# Patient Record
Sex: FEMALE | Race: WHITE | NOT HISPANIC OR LATINO | Employment: STUDENT | URBAN - METROPOLITAN AREA
[De-identification: names, ages, dates, MRNs, and addresses within clinical notes are randomized per-mention and may not be internally consistent; named-entity substitution may affect disease eponyms.]

---
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
## 2024-07-08 ENCOUNTER — TELEPHONE (OUTPATIENT)
Dept: OBSTETRICS AND GYNECOLOGY | Facility: CLINIC | Age: 19
End: 2024-07-08
Payer: MEDICAID

## 2024-07-08 NOTE — TELEPHONE ENCOUNTER
----- Message from Aleyda Hernandez sent at 7/8/2024 11:30 AM CDT -----  Contact: Pt Mom  Type:  Needs Medical Advice  Who Called:  Pt  Symptoms (please be specific): Pg confirmation   Best Call Back Number:  [phone]  Additional Information:  Pt Mom is trying to find out of Dr. Santiago will accept pt ins for Pg confirm.  Pt Mom would like to speak w/someone in the ofc of the doctor... Please call to advise... Thank you...

## 2024-07-08 NOTE — TELEPHONE ENCOUNTER
Returned pt call and answered pt concerns about if  accepting medicaid OB patients. Pt understood and already been scheduled.

## 2024-07-29 ENCOUNTER — OFFICE VISIT (OUTPATIENT)
Dept: OBSTETRICS AND GYNECOLOGY | Facility: CLINIC | Age: 19
End: 2024-07-29
Payer: MEDICAID

## 2024-07-29 VITALS — WEIGHT: 189.38 LBS | DIASTOLIC BLOOD PRESSURE: 74 MMHG | SYSTOLIC BLOOD PRESSURE: 124 MMHG

## 2024-07-29 DIAGNOSIS — O21.9 NAUSEA/VOMITING IN PREGNANCY: ICD-10-CM

## 2024-07-29 DIAGNOSIS — Z32.01 ENCOUNTER FOR PREGNANCY TEST, RESULT POSITIVE: Primary | ICD-10-CM

## 2024-07-29 DIAGNOSIS — Z3A.12 12 WEEKS GESTATION OF PREGNANCY: ICD-10-CM

## 2024-07-29 DIAGNOSIS — Z12.4 CERVICAL CANCER SCREENING: ICD-10-CM

## 2024-07-29 LAB
B-HCG UR QL: POSITIVE
CTP QC/QA: YES

## 2024-07-29 PROCEDURE — 99999 PR PBB SHADOW E&M-EST. PATIENT-LVL II: CPT | Mod: PBBFAC,,, | Performed by: OBSTETRICS & GYNECOLOGY

## 2024-07-29 PROCEDURE — 87088 URINE BACTERIA CULTURE: CPT | Performed by: OBSTETRICS & GYNECOLOGY

## 2024-07-29 PROCEDURE — 87086 URINE CULTURE/COLONY COUNT: CPT | Performed by: OBSTETRICS & GYNECOLOGY

## 2024-07-29 PROCEDURE — 99212 OFFICE O/P EST SF 10 MIN: CPT | Mod: PBBFAC,TH,PN,25 | Performed by: OBSTETRICS & GYNECOLOGY

## 2024-07-29 PROCEDURE — 76817 TRANSVAGINAL US OBSTETRIC: CPT | Mod: PBBFAC,PN | Performed by: OBSTETRICS & GYNECOLOGY

## 2024-07-29 PROCEDURE — 87591 N.GONORRHOEAE DNA AMP PROB: CPT | Performed by: OBSTETRICS & GYNECOLOGY

## 2024-07-29 PROCEDURE — 87186 SC STD MICRODIL/AGAR DIL: CPT | Performed by: OBSTETRICS & GYNECOLOGY

## 2024-07-29 PROCEDURE — 87491 CHLMYD TRACH DNA AMP PROBE: CPT | Performed by: OBSTETRICS & GYNECOLOGY

## 2024-07-29 PROCEDURE — 88175 CYTOPATH C/V AUTO FLUID REDO: CPT | Performed by: OBSTETRICS & GYNECOLOGY

## 2024-07-29 PROCEDURE — 81025 URINE PREGNANCY TEST: CPT | Mod: PBBFAC,PN | Performed by: OBSTETRICS & GYNECOLOGY

## 2024-07-29 PROCEDURE — 99999PBSHW POCT URINE PREGNANCY: Mod: PBBFAC,,,

## 2024-07-29 PROCEDURE — 87661 TRICHOMONAS VAGINALIS AMPLIF: CPT | Performed by: OBSTETRICS & GYNECOLOGY

## 2024-07-29 RX ORDER — ASPIRIN 81 MG/1
81 TABLET ORAL DAILY
Qty: 150 TABLET | Refills: 2 | Status: SHIPPED | OUTPATIENT
Start: 2024-07-29 | End: 2025-07-29

## 2024-07-29 RX ORDER — PYRIDOXINE HCL (VITAMIN B6) 25 MG
25 TABLET ORAL 3 TIMES DAILY
Qty: 90 TABLET | Refills: 1 | Status: SHIPPED | OUTPATIENT
Start: 2024-07-29 | End: 2024-08-28

## 2024-07-29 RX ORDER — PROMETHAZINE HYDROCHLORIDE 25 MG/1
25 TABLET ORAL EVERY 6 HOURS PRN
Qty: 30 TABLET | Refills: 1 | Status: SHIPPED | OUTPATIENT
Start: 2024-07-29

## 2024-07-29 NOTE — PROGRESS NOTES
Subjective:      Chela Gold is being seen today for her first obstetrical visit.  She is at Unknown gestation.     Patient's last menstrual period was 05/04/2024 (exact date).   No prior ultrasound  Complains of nausea    Menstrual History:  OB History    No obstetric history on file.          The following portions of the patient's history were reviewed and updated as appropriate: allergies, current medications, past family history, past medical history, past social history, past surgical history, and problem list.     Review of Systems  Constitutional: negative for chills and fatigue  Gastrointestinal: negative for abdominal pain, constipation, diarrhea, nausea and vomiting  Genitourinary:negative for abnormal menstrual periods, genital lesions and vaginal discharge, dysuria, frequency and hematuria     Objective:      /74   Wt 85.9 kg (189 lb 6 oz)   LMP 05/04/2024 (Exact Date)   General appearance: alert, appears stated age, and cooperative      ULTRASOUND:   Bedside Ultrasound Findings    EXAMINATION:  US PELVIS COMP WITH TRANSVAG OB    CLINICAL HISTORY:  UPT positive    TECHNIQUE:  Transvaginal sonography    COMPARISON:  None    FINDINGS:  1. Uterus:    Appearance: Viable powell intrauterine pregnancy was seen, yolk sac was not seen. Crown-rump length = 61.9 mm with flicker, consistent with 12.4wga and EDC 2/6/2025.        Impression      1. Single viable intrauterine pregnancy   2. Crown rump length consistent with 12.4wga, and estimated due date 2/6/2025    Final EDC: 2/6/2024 by 12wk US    Assessment:      Pregnancy at Unknown       Plan:      Initial labs drawn.  Prenatal vitamins.  Problem list reviewed and updated.    Encounter for pregnancy test, result positive  -     POCT urine pregnancy  -     Trichomonas vaginalis, RNA, Qual, Urine  -     C. trachomatis/N. gonorrhoeae by [facility]; Urine  -     CULTURE, URINE  -     Varicella Zoster Antibody, IgG; Future; Expected date:  07/29/2024  -     Type & Screen - Ob Profile; Future; Expected date: 07/29/2024  -     Rubella Antibody, IgG; Future; Expected date: 07/29/2024  -     HIV 1/2 Ag/Ab (4th Gen); Future; Expected date: 07/29/2024  -     Hepatitis C Antibody; Future; Expected date: 07/29/2024  -     Hepatitis B Surface Antigen; Future; Expected date: 07/29/2024  -     CBC Auto Differential; Future; Expected date: 07/29/2024  -     Treponema Pallidium Antibodies IgG, IgM; Future; Expected date: 07/29/2024  -     aspirin (ECOTRIN) 81 MG EC tablet; Take 1 tablet (81 mg total) by mouth once daily. At 12 weeks start taking aspirin daily to prevent preeclampsia.  Dispense: 150 tablet; Refill: 2    Nausea/vomiting in pregnancy  -     promethazine (PHENERGAN) 25 MG tablet; Take 1 tablet (25 mg total) by mouth every 6 (six) hours as needed for Nausea.  Dispense: 30 tablet; Refill: 1  -     doxylamine succinate (UNISOM, DOXYLAMINE,) 25 mg tablet; Take 1/2 tablet three times a day to prevent nausea/vomitting in pregnancy  -     pyridoxine, vitamin B6, (B-6) 25 MG Tab; Take 1 tablet (25 mg total) by mouth 3 (three) times daily.  Dispense: 90 tablet; Refill: 1    Cervical cancer screening  -     Cancel: Liquid-Based Pap Smear, Screening    12 weeks gestation of pregnancy    Other orders  -     Pap Smear, Thin Prep with Reflex to HPV        Follow up in 4 weeks.    I reviewed today her blood pressure, weight gain, urine results and  fetal heart rate.  I have reviewed the previous labs and ultrasound with the patient.   I have answered questions to her satisfaction and total of 20 minutes spend today

## 2024-07-31 DIAGNOSIS — O23.40 URINARY TRACT INFECTION IN MOTHER DURING PREGNANCY, ANTEPARTUM: Primary | ICD-10-CM

## 2024-07-31 LAB
C TRACH DNA SPEC QL NAA+PROBE: NOT DETECTED
N GONORRHOEA DNA SPEC QL NAA+PROBE: NOT DETECTED
SPECIMEN SOURCE: NORMAL
T VAGINALIS RRNA SPEC QL NAA+PROBE: NEGATIVE

## 2024-07-31 RX ORDER — AMOXICILLIN AND CLAVULANATE POTASSIUM 875; 125 MG/1; MG/1
1 TABLET, FILM COATED ORAL EVERY 12 HOURS
Qty: 10 TABLET | Refills: 0 | Status: SHIPPED | OUTPATIENT
Start: 2024-07-31 | End: 2024-08-05

## 2024-07-31 NOTE — PROCEDURES
Procedures  ULTRASOUND:   Bedside Ultrasound Findings    EXAMINATION:  US PELVIS COMP WITH TRANSVAG OB    CLINICAL HISTORY:  UPT positive    TECHNIQUE:  Transvaginal sonography    COMPARISON:  None    FINDINGS:  1. Uterus:    Appearance: Viable powell intrauterine pregnancy was seen, yolk sac was not seen. Crown-rump length = 61.9 mm with flicker, consistent with 12.4wga and EDC 2/6/2025.        Impression      1. Single viable intrauterine pregnancy   2. Crown rump length consistent with 12.4wga, and estimated due date 2/6/2025    Final EDC: 2/6/2024 by 12wk US

## 2024-08-01 ENCOUNTER — TELEPHONE (OUTPATIENT)
Dept: OBSTETRICS AND GYNECOLOGY | Facility: CLINIC | Age: 19
End: 2024-08-01
Payer: MEDICAID

## 2024-08-01 DIAGNOSIS — O23.40 URINARY TRACT INFECTION IN MOTHER DURING PREGNANCY, ANTEPARTUM: Primary | ICD-10-CM

## 2024-08-01 LAB — BACTERIA UR CULT: ABNORMAL

## 2024-08-01 RX ORDER — NITROFURANTOIN 25; 75 MG/1; MG/1
100 CAPSULE ORAL 2 TIMES DAILY
Qty: 10 CAPSULE | Refills: 0 | Status: SHIPPED | OUTPATIENT
Start: 2024-08-01 | End: 2024-08-06

## 2024-08-01 NOTE — TELEPHONE ENCOUNTER
----- Message from Mary Jane Santiago MD sent at 7/31/2024  5:06 PM CDT -----  Your urinalysis is consistent with a urinary tract infection.  I have called in a prescription for an antibiotic, Augmentin, to assist in eliminating this infection.   If you have any further questions or concerns, please do not hesitate to contact me.      Mary Jane Santiago MD

## 2024-08-02 ENCOUNTER — PATIENT MESSAGE (OUTPATIENT)
Dept: OBSTETRICS AND GYNECOLOGY | Facility: CLINIC | Age: 19
End: 2024-08-02
Payer: MEDICAID

## 2024-08-04 ENCOUNTER — PATIENT MESSAGE (OUTPATIENT)
Dept: OBSTETRICS AND GYNECOLOGY | Facility: CLINIC | Age: 19
End: 2024-08-04
Payer: MEDICAID

## 2024-08-04 DIAGNOSIS — O23.40 URINARY TRACT INFECTION IN MOTHER DURING PREGNANCY, ANTEPARTUM: ICD-10-CM

## 2024-08-05 RX ORDER — AMOXICILLIN AND CLAVULANATE POTASSIUM 875; 125 MG/1; MG/1
1 TABLET, FILM COATED ORAL EVERY 12 HOURS
Qty: 10 TABLET | Refills: 0 | Status: SHIPPED | OUTPATIENT
Start: 2024-08-05 | End: 2024-08-10

## 2024-08-06 ENCOUNTER — LAB VISIT (OUTPATIENT)
Dept: LAB | Facility: HOSPITAL | Age: 19
End: 2024-08-06
Attending: OBSTETRICS & GYNECOLOGY
Payer: MEDICAID

## 2024-08-06 DIAGNOSIS — Z32.01 ENCOUNTER FOR PREGNANCY TEST, RESULT POSITIVE: ICD-10-CM

## 2024-08-06 LAB
ABO + RH BLD: NORMAL
BASOPHILS # BLD AUTO: 0.03 K/UL (ref 0–0.2)
BASOPHILS NFR BLD: 0.3 % (ref 0–1.9)
BLD GP AB SCN CELLS X3 SERPL QL: NORMAL
DIFFERENTIAL METHOD BLD: NORMAL
EOSINOPHIL # BLD AUTO: 0.1 K/UL (ref 0–0.5)
EOSINOPHIL NFR BLD: 0.9 % (ref 0–8)
ERYTHROCYTE [DISTWIDTH] IN BLOOD BY AUTOMATED COUNT: 13.1 % (ref 11.5–14.5)
HBV SURFACE AG SERPL QL IA: NORMAL
HCT VFR BLD AUTO: 37.6 % (ref 37–48.5)
HCV AB SERPL QL IA: NORMAL
HGB BLD-MCNC: 12.4 G/DL (ref 12–16)
HIV 1+2 AB+HIV1 P24 AG SERPL QL IA: NORMAL
IMM GRANULOCYTES # BLD AUTO: 0.01 K/UL (ref 0–0.04)
IMM GRANULOCYTES NFR BLD AUTO: 0.1 % (ref 0–0.5)
LYMPHOCYTES # BLD AUTO: 2 K/UL (ref 1–4.8)
LYMPHOCYTES NFR BLD: 21.4 % (ref 18–48)
MCH RBC QN AUTO: 30.2 PG (ref 27–31)
MCHC RBC AUTO-ENTMCNC: 33 G/DL (ref 32–36)
MCV RBC AUTO: 92 FL (ref 82–98)
MONOCYTES # BLD AUTO: 0.5 K/UL (ref 0.3–1)
MONOCYTES NFR BLD: 5 % (ref 4–15)
NEUTROPHILS # BLD AUTO: 6.7 K/UL (ref 1.8–7.7)
NEUTROPHILS NFR BLD: 72.3 % (ref 38–73)
NRBC BLD-RTO: 0 /100 WBC
PLATELET # BLD AUTO: 217 K/UL (ref 150–450)
PMV BLD AUTO: 9.9 FL (ref 9.2–12.9)
RBC # BLD AUTO: 4.1 M/UL (ref 4–5.4)
TREPONEMA PALLIDUM IGG+IGM AB [PRESENCE] IN SERUM OR PLASMA BY IMMUNOASSAY: NONREACTIVE
WBC # BLD AUTO: 9.25 K/UL (ref 3.9–12.7)

## 2024-08-06 PROCEDURE — 87389 HIV-1 AG W/HIV-1&-2 AB AG IA: CPT | Performed by: OBSTETRICS & GYNECOLOGY

## 2024-08-06 PROCEDURE — 86787 VARICELLA-ZOSTER ANTIBODY: CPT | Performed by: OBSTETRICS & GYNECOLOGY

## 2024-08-06 PROCEDURE — 86900 BLOOD TYPING SEROLOGIC ABO: CPT | Performed by: OBSTETRICS & GYNECOLOGY

## 2024-08-06 PROCEDURE — 86593 SYPHILIS TEST NON-TREP QUANT: CPT | Performed by: OBSTETRICS & GYNECOLOGY

## 2024-08-06 PROCEDURE — 85025 COMPLETE CBC W/AUTO DIFF WBC: CPT | Performed by: OBSTETRICS & GYNECOLOGY

## 2024-08-06 PROCEDURE — 86901 BLOOD TYPING SEROLOGIC RH(D): CPT | Performed by: OBSTETRICS & GYNECOLOGY

## 2024-08-06 PROCEDURE — 86803 HEPATITIS C AB TEST: CPT | Performed by: OBSTETRICS & GYNECOLOGY

## 2024-08-06 PROCEDURE — 86762 RUBELLA ANTIBODY: CPT | Performed by: OBSTETRICS & GYNECOLOGY

## 2024-08-06 PROCEDURE — 36415 COLL VENOUS BLD VENIPUNCTURE: CPT | Mod: PN | Performed by: OBSTETRICS & GYNECOLOGY

## 2024-08-06 PROCEDURE — 86850 RBC ANTIBODY SCREEN: CPT | Performed by: OBSTETRICS & GYNECOLOGY

## 2024-08-06 PROCEDURE — 87340 HEPATITIS B SURFACE AG IA: CPT | Performed by: OBSTETRICS & GYNECOLOGY

## 2024-08-07 LAB
RUBV IGG SER-ACNC: 14.6 IU/ML
RUBV IGG SER-IMP: REACTIVE
VARICELLA INTERPRETATION: POSITIVE
VARICELLA ZOSTER IGG: 498.2

## 2024-08-16 ENCOUNTER — PATIENT MESSAGE (OUTPATIENT)
Dept: OBSTETRICS AND GYNECOLOGY | Facility: CLINIC | Age: 19
End: 2024-08-16
Payer: MEDICAID

## 2024-08-19 ENCOUNTER — TELEPHONE (OUTPATIENT)
Dept: OBSTETRICS AND GYNECOLOGY | Facility: CLINIC | Age: 19
End: 2024-08-19
Payer: MEDICAID

## 2024-08-19 NOTE — TELEPHONE ENCOUNTER
----- Message from Leander Marcos sent at 8/19/2024  4:54 PM CDT -----  Contact: Self  Type: Needs Medical Advice  Who Called:  Patient    Best Call Back Number: [phone]  Additional Information: Patient has an appt tomorrow, but she is requesting a call back to see if she needs to push it back or not

## 2024-08-19 NOTE — TELEPHONE ENCOUNTER
Called pt to get her rescheduled , LVM to call office back or message on what time and date she wants to switch to and ill get it worked out for her.

## 2024-08-20 ENCOUNTER — PATIENT MESSAGE (OUTPATIENT)
Dept: OBSTETRICS AND GYNECOLOGY | Facility: CLINIC | Age: 19
End: 2024-08-20
Payer: MEDICAID

## 2024-09-06 ENCOUNTER — ROUTINE PRENATAL (OUTPATIENT)
Dept: OBSTETRICS AND GYNECOLOGY | Facility: CLINIC | Age: 19
End: 2024-09-06
Payer: MEDICAID

## 2024-09-06 VITALS — DIASTOLIC BLOOD PRESSURE: 80 MMHG | SYSTOLIC BLOOD PRESSURE: 124 MMHG | WEIGHT: 191.56 LBS

## 2024-09-06 DIAGNOSIS — Z3A.18 18 WEEKS GESTATION OF PREGNANCY: Primary | ICD-10-CM

## 2024-09-06 LAB
BILIRUBIN, UA POC OHS: NEGATIVE
BLOOD, UA POC OHS: NEGATIVE
CLARITY, UA POC OHS: CLEAR
COLOR, UA POC OHS: YELLOW
GLUCOSE, UA POC OHS: NEGATIVE
KETONES, UA POC OHS: NEGATIVE
LEUKOCYTES, UA POC OHS: ABNORMAL
NITRITE, UA POC OHS: NEGATIVE
PH, UA POC OHS: 7.5
PROTEIN, UA POC OHS: ABNORMAL
SPECIFIC GRAVITY, UA POC OHS: 1.02
UROBILINOGEN, UA POC OHS: 0.2

## 2024-09-06 PROCEDURE — 99999 PR PBB SHADOW E&M-EST. PATIENT-LVL II: CPT | Mod: PBBFAC,,,

## 2024-09-06 PROCEDURE — 81003 URINALYSIS AUTO W/O SCOPE: CPT | Mod: PBBFAC,PN

## 2024-09-06 PROCEDURE — 99213 OFFICE O/P EST LOW 20 MIN: CPT | Mod: TH,S$PBB,,

## 2024-09-06 PROCEDURE — 99212 OFFICE O/P EST SF 10 MIN: CPT | Mod: PBBFAC,PN

## 2024-09-06 PROCEDURE — 99999PBSHW POCT URINALYSIS(INSTRUMENT): Mod: PBBFAC,,,

## 2024-09-06 NOTE — PROGRESS NOTES
The patient presents with complaints of intermittent cough. Hx of asthma, hasn't used an inhaler in 9 years. Denies SOB or chest pain. Recommend daily Zyrtec or Claritin   Reports: no perceived fetal movements yet. No Bleeding or pains    Signed release of records at last visit to obtain surgery records from back surgery she had at age 10. Fusion of L5-S1. No records noted in media. [facility]    2024  19 y.o. 18w1d Estimated Date of Delivery: 25, dating reviewed.   OB History    Para Term  AB Living   1             SAB IAB Ectopic Multiple Live Births                  # Outcome Date GA Lbr Richard/2nd Weight Sex Type Anes PTL Lv   1 Current                Prenatal labs reviewed and updated today    Review of Systems:  General ROS: negative for headache or visual changes  Breast ROS: negative for breast lumps  Gastrointestinal ROS: negative for constipation, diarrhea or nausea/vomiting  Musculoskeletal ROS: negative for pain in joints or swelling in face or hands.   Neurological ROS: negative for - headaches, numbness/tingling or visual changes      Physical Exam:  /80   Wt 86.9 kg (191 lb 9.3 oz)   LMP 2024 (Exact Date)   Urine Dip: Pending  Fundal Height:deferred   Fetal Heart Tones: 156 bpm  Constitutional: She is oriented to person, place, and time. She appears well-developed and well-nourished. No distress. Normal weight    Cardiovascular: Normal rate.    Pulmonary/Chest: Effort normal. No respiratory distress  Abdominal: Soft, gravid, nontender. No rebound and no guarding.     Genitourinary: Deferred     Musculoskeletal: Normal range of motion, no peripheral edema.   Neurological: She is alert and oriented to person, place, and time. Coordination normal.   Skin: Skin is warm and dry. She is not diaphoretic.  Psychiatric: She has a normal mood and affect.        Assessment:  19 y.o., at 18w1d Gestation   Patient Active Problem List    Diagnosis    Asthma, currently active    Vocal cord dysfunction    Spondylolisthesis     Current Outpatient Medications on File Prior to Visit   Medication Sig Dispense Refill    aspirin (ECOTRIN) 81 MG EC tablet Take 1 tablet (81 mg total) by mouth once daily. At 12 weeks start taking aspirin daily to prevent preeclampsia. 150 tablet 2    doxylamine succinate (UNISOM, DOXYLAMINE,) 25 mg tablet Take 1/2 tablet three times a day to prevent nausea/vomitting in pregnancy      promethazine (PHENERGAN) 25 MG tablet Take 1 tablet (25 mg total) by mouth every 6 (six) hours as needed for Nausea. (Patient not taking: Reported on 9/6/2024) 30 tablet 1     No current facility-administered medications on file prior to visit.       Plan:   Labs today: none  Orders today: anatomy at next visit   MEds today: none  Procedures Today: none  Follow up 4 Weeks, bleeding/pain precautions, kick counts, labor precautions

## 2024-09-10 ENCOUNTER — PATIENT MESSAGE (OUTPATIENT)
Dept: ADMINISTRATIVE | Facility: OTHER | Age: 19
End: 2024-09-10
Payer: MEDICAID

## 2024-09-19 ENCOUNTER — PATIENT MESSAGE (OUTPATIENT)
Dept: OTHER | Facility: OTHER | Age: 19
End: 2024-09-19
Payer: MEDICAID

## 2024-10-07 ENCOUNTER — HOSPITAL ENCOUNTER (OUTPATIENT)
Dept: RADIOLOGY | Facility: HOSPITAL | Age: 19
Discharge: HOME | End: 2024-10-07
Attending: OBSTETRICS & GYNECOLOGY
Payer: MEDICAID

## 2024-10-07 ENCOUNTER — ROUTINE PRENATAL (OUTPATIENT)
Dept: OBSTETRICS AND GYNECOLOGY | Facility: CLINIC | Age: 19
End: 2024-10-07
Payer: MEDICAID

## 2024-10-07 VITALS — SYSTOLIC BLOOD PRESSURE: 122 MMHG | DIASTOLIC BLOOD PRESSURE: 74 MMHG

## 2024-10-07 DIAGNOSIS — Z3A.22 22 WEEKS GESTATION OF PREGNANCY: ICD-10-CM

## 2024-10-07 DIAGNOSIS — Z34.92 ENCOUNTER FOR SUPERVISION OF NORMAL PREGNANCY IN SECOND TRIMESTER, UNSPECIFIED GRAVIDITY: Primary | ICD-10-CM

## 2024-10-07 DIAGNOSIS — Z3A.18 18 WEEKS GESTATION OF PREGNANCY: ICD-10-CM

## 2024-10-07 DIAGNOSIS — Z32.01 ENCOUNTER FOR PREGNANCY TEST, RESULT POSITIVE: ICD-10-CM

## 2024-10-07 LAB
BILIRUBIN, UA POC OHS: NEGATIVE
BLOOD, UA POC OHS: NEGATIVE
CLARITY, UA POC OHS: CLEAR
COLOR, UA POC OHS: YELLOW
GLUCOSE, UA POC OHS: NEGATIVE
KETONES, UA POC OHS: NEGATIVE
LEUKOCYTES, UA POC OHS: NEGATIVE
NITRITE, UA POC OHS: NEGATIVE
PH, UA POC OHS: 7
PROTEIN, UA POC OHS: NEGATIVE
SPECIFIC GRAVITY, UA POC OHS: 1.02
UROBILINOGEN, UA POC OHS: 0.2

## 2024-10-07 PROCEDURE — 99999PBSHW POCT URINALYSIS(INSTRUMENT): Mod: PBBFAC,,,

## 2024-10-07 PROCEDURE — 99213 OFFICE O/P EST LOW 20 MIN: CPT | Mod: TH,S$PBB,, | Performed by: OBSTETRICS & GYNECOLOGY

## 2024-10-07 PROCEDURE — 81003 URINALYSIS AUTO W/O SCOPE: CPT | Mod: PBBFAC,PN | Performed by: OBSTETRICS & GYNECOLOGY

## 2024-10-07 PROCEDURE — 99999 PR PBB SHADOW E&M-EST. PATIENT-LVL II: CPT | Mod: PBBFAC,,, | Performed by: OBSTETRICS & GYNECOLOGY

## 2024-10-07 PROCEDURE — 76805 OB US >/= 14 WKS SNGL FETUS: CPT | Mod: TC,PN

## 2024-10-07 PROCEDURE — 99212 OFFICE O/P EST SF 10 MIN: CPT | Mod: PBBFAC,25,TH,PN | Performed by: OBSTETRICS & GYNECOLOGY

## 2024-10-07 NOTE — PROGRESS NOTES
Subjective:      Chela Gold is a 19 y.o. female being seen today for her obstetrical visit. She is at 22w4d gestation. Patient reports no complaints.     OB ROS: no vaginal bleeding, no leakage of fluid, no contractions/cramping, no vaginal discharge. Fetal movement: normal.    Menstrual History:  OB History          1    Para        Term                AB        Living             SAB        IAB        Ectopic        Multiple        Live Births                    Patient's last menstrual period was 2024 (exact date).       Objective:      /74   LMP 2024 (Exact Date)     Vitals  BP: 122/74  Prenatal  Fetal Heart Rate: 140  Movement: Present  Fundal height not measured  0 kg ()           Prenatal Labs:  Lab Results   Component Value Date    GROUPTRH A POS 2024    HGB 12.4 2024    HCT 37.6 2024     2024    RUBELLAIMMUN Reactive 2024    HEPBSAG Non-reactive 2024    [id number] Non-reactive 2024    LABCHLA Not Detected 2024    LABNGO Not Detected 2024    LABURIN ESCHERICHIA COLI  >100,000 cfu/ml   (A) 2024       Assessment:      Pregnancy 22w4d      Plan:     Final EDC: 2024 by 12wk US  Childhood asthma, has inhaler, rarely using  Back Surg: needs anesthesia consults  Genetic: desires  Nullip  UTI 1st Tri: Treated.   Pap 2024 NILM  Peds: Unsure  Breastfeeding, needs pump  Contraception: unsure          1. Encounter for supervision of normal pregnancy in second trimester, unspecified   -     POCT Urinalysis(Instrument)  -     US OB Limited 1 Or More Gestations; Future; Expected date: 2024  -     OB Glucose Screen; Future; Expected date: 10/07/2024    2. Encounter for pregnancy test, result positive    3. 22 weeks gestation of pregnancy         Follow up in 4 weeks.       I reviewed today her blood pressure, weight gain, urine results and  fetal heart rate.  I have reviewed the previous labs and  ultrasound with the patient.   I have answered questions to her satisfaction and total of 20 minutes spend today

## 2024-10-17 ENCOUNTER — PATIENT MESSAGE (OUTPATIENT)
Dept: OTHER | Facility: OTHER | Age: 19
End: 2024-10-17
Payer: MEDICAID

## 2024-10-31 ENCOUNTER — PATIENT MESSAGE (OUTPATIENT)
Dept: OTHER | Facility: OTHER | Age: 19
End: 2024-10-31
Payer: MEDICAID

## 2024-11-06 ENCOUNTER — ROUTINE PRENATAL (OUTPATIENT)
Dept: OBSTETRICS AND GYNECOLOGY | Facility: CLINIC | Age: 19
End: 2024-11-06
Payer: MEDICAID

## 2024-11-06 ENCOUNTER — HOSPITAL ENCOUNTER (OUTPATIENT)
Dept: RADIOLOGY | Facility: HOSPITAL | Age: 19
Discharge: HOME | End: 2024-11-06
Attending: OBSTETRICS & GYNECOLOGY
Payer: MEDICAID

## 2024-11-06 VITALS — WEIGHT: 201.25 LBS | SYSTOLIC BLOOD PRESSURE: 110 MMHG | DIASTOLIC BLOOD PRESSURE: 68 MMHG

## 2024-11-06 DIAGNOSIS — Z34.92 ENCOUNTER FOR SUPERVISION OF NORMAL PREGNANCY IN SECOND TRIMESTER, UNSPECIFIED GRAVIDITY: Primary | ICD-10-CM

## 2024-11-06 DIAGNOSIS — Z34.92 ENCOUNTER FOR SUPERVISION OF NORMAL PREGNANCY IN SECOND TRIMESTER, UNSPECIFIED GRAVIDITY: ICD-10-CM

## 2024-11-06 LAB
BILIRUBIN, UA POC OHS: NEGATIVE
BLOOD, UA POC OHS: NEGATIVE
CLARITY, UA POC OHS: CLEAR
COLOR, UA POC OHS: YELLOW
GLUCOSE, UA POC OHS: NEGATIVE
KETONES, UA POC OHS: NEGATIVE
LEUKOCYTES, UA POC OHS: ABNORMAL
NITRITE, UA POC OHS: NEGATIVE
PH, UA POC OHS: 6.5
PROTEIN, UA POC OHS: NEGATIVE
SPECIFIC GRAVITY, UA POC OHS: 1.02
UROBILINOGEN, UA POC OHS: 0.2

## 2024-11-06 PROCEDURE — 99212 OFFICE O/P EST SF 10 MIN: CPT | Mod: PBBFAC,25,TH,PN | Performed by: OBSTETRICS & GYNECOLOGY

## 2024-11-06 PROCEDURE — 99999PBSHW POCT URINALYSIS(INSTRUMENT): Mod: PBBFAC,,,

## 2024-11-06 PROCEDURE — 99213 OFFICE O/P EST LOW 20 MIN: CPT | Mod: TH,S$PBB,, | Performed by: OBSTETRICS & GYNECOLOGY

## 2024-11-06 PROCEDURE — 76815 OB US LIMITED FETUS(S): CPT | Mod: 26,,, | Performed by: RADIOLOGY

## 2024-11-06 PROCEDURE — 76815 OB US LIMITED FETUS(S): CPT | Mod: TC,PN

## 2024-11-06 PROCEDURE — 99999 PR PBB SHADOW E&M-EST. PATIENT-LVL II: CPT | Mod: PBBFAC,,, | Performed by: OBSTETRICS & GYNECOLOGY

## 2024-11-06 PROCEDURE — 81003 URINALYSIS AUTO W/O SCOPE: CPT | Mod: PBBFAC,PN | Performed by: OBSTETRICS & GYNECOLOGY

## 2024-11-06 NOTE — PROGRESS NOTES
Subjective:      Chela Gold is a 19 y.o. female being seen today for her obstetrical visit. She is at 26w6d gestation. Patient reports no complaints.     OB ROS: no vaginal bleeding, no leakage of fluid, no contractions/cramping, no vaginal discharge. Fetal movement: normal.    Menstrual History:  OB History          1    Para        Term                AB        Living             SAB        IAB        Ectopic        Multiple        Live Births                    Patient's last menstrual period was 2024 (exact date).       Objective:      /68   Wt 91.3 kg (201 lb 4.5 oz)   LMP 2024 (Exact Date)     Vitals  BP: 110/68  Weight: 91.3 kg (201 lb 4.5 oz)  Prenatal  Fetal Heart Rate: 146  Movement: Present  Fundal height size equal to dates  4.4 kg (9 lb 11.2 oz)           Prenatal Labs:  Lab Results   Component Value Date    GROUPTRH A POS 2024    HGB 12.4 2024    HCT 37.6 2024     2024    RUBELLAIMMUN Reactive 2024    HEPBSAG Non-reactive 2024    [id number] Non-reactive 2024    LABCHLA Not Detected 2024    LABNGO Not Detected 2024    LABURIN ESCHERICHIA COLI  >100,000 cfu/ml   (A) 2024       Assessment:      Pregnancy 26w6d      Plan:     Final EDC: 2024 by 12wk ultrasound  BMI: 33, recommended 11-20# gain  Childhood asthma, has inhaler, rarely using  Back Surg: needs anesthesia consults  Genetic: declined  Nullip: aspirin   UTI 1st Tri: Treated.   Pap 2024 NILM  Peds: Unsure  Breastfeeding, needs pump  Contraception: unsure          1. Encounter for supervision of normal pregnancy in second trimester, unspecified   -     POCT Urinalysis(Instrument)         Follow up in 4 weeks.       I reviewed today her blood pressure, weight gain, urine results and  fetal heart rate.  I have reviewed the previous labs and ultrasound with the patient.   I have answered questions to her satisfaction and total of  20 minutes spend today

## 2024-11-08 ENCOUNTER — TELEPHONE (OUTPATIENT)
Dept: OBSTETRICS AND GYNECOLOGY | Facility: CLINIC | Age: 19
End: 2024-11-08
Payer: MEDICAID

## 2024-11-08 NOTE — TELEPHONE ENCOUNTER
Returned pt call and stating that I got her message from phone room that she need an end date for her parking pass at school. Pt stated she want her parking pass dated 12 weeks after her delivery.

## 2024-11-08 NOTE — TELEPHONE ENCOUNTER
----- Message from Ahonya sent at 11/7/2024  2:20 PM CST -----  Type : Patient Call      Who Called : Patient      Does the patient know what this is regarding?: Patient came in yesterday for an apt; pt also received a letter for parking accommodations; pt says her school won't except the letter unless theres a end date listed on the letter; please advise        Would the patient rather a call back or a response via [facility]? Call        Best Call Back Number: [phone]        Additional Information:

## 2024-11-14 ENCOUNTER — PATIENT MESSAGE (OUTPATIENT)
Dept: OTHER | Facility: OTHER | Age: 19
End: 2024-11-14
Payer: MEDICAID

## 2024-11-28 ENCOUNTER — PATIENT MESSAGE (OUTPATIENT)
Dept: OTHER | Facility: OTHER | Age: 19
End: 2024-11-28
Payer: MEDICAID

## 2024-12-05 ENCOUNTER — ROUTINE PRENATAL (OUTPATIENT)
Dept: OBSTETRICS AND GYNECOLOGY | Facility: CLINIC | Age: 19
End: 2024-12-05
Payer: MEDICAID

## 2024-12-05 ENCOUNTER — TELEPHONE (OUTPATIENT)
Dept: OBSTETRICS AND GYNECOLOGY | Facility: CLINIC | Age: 19
End: 2024-12-05
Payer: MEDICAID

## 2024-12-05 VITALS — SYSTOLIC BLOOD PRESSURE: 114 MMHG | DIASTOLIC BLOOD PRESSURE: 76 MMHG | WEIGHT: 209.75 LBS

## 2024-12-05 DIAGNOSIS — Z34.92 ENCOUNTER FOR SUPERVISION OF NORMAL PREGNANCY IN SECOND TRIMESTER, UNSPECIFIED GRAVIDITY: Primary | ICD-10-CM

## 2024-12-05 LAB
BILIRUBIN, UA POC OHS: NEGATIVE
BLOOD, UA POC OHS: NEGATIVE
CLARITY, UA POC OHS: CLEAR
COLOR, UA POC OHS: YELLOW
GLUCOSE, UA POC OHS: NEGATIVE
KETONES, UA POC OHS: NEGATIVE
LEUKOCYTES, UA POC OHS: ABNORMAL
NITRITE, UA POC OHS: NEGATIVE
PH, UA POC OHS: 6.5
PROTEIN, UA POC OHS: ABNORMAL
SPECIFIC GRAVITY, UA POC OHS: >=1.03
UROBILINOGEN, UA POC OHS: 0.2

## 2024-12-05 PROCEDURE — 81003 URINALYSIS AUTO W/O SCOPE: CPT | Mod: PBBFAC,PN | Performed by: OBSTETRICS & GYNECOLOGY

## 2024-12-05 PROCEDURE — 99999PBSHW POCT URINALYSIS(INSTRUMENT): Mod: PBBFAC,,,

## 2024-12-05 PROCEDURE — 99999 PR PBB SHADOW E&M-EST. PATIENT-LVL II: CPT | Mod: PBBFAC,,, | Performed by: OBSTETRICS & GYNECOLOGY

## 2024-12-05 PROCEDURE — 99213 OFFICE O/P EST LOW 20 MIN: CPT | Mod: TH,S$PBB,, | Performed by: OBSTETRICS & GYNECOLOGY

## 2024-12-05 PROCEDURE — 99212 OFFICE O/P EST SF 10 MIN: CPT | Mod: PBBFAC,TH,PN | Performed by: OBSTETRICS & GYNECOLOGY

## 2024-12-05 NOTE — PROGRESS NOTES
Subjective:      Chela Gold is a 19 y.o. female being seen today for her obstetrical visit. She is at 31w0d gestation. Patient reports no complaints.     OB ROS: no vaginal bleeding, no leakage of fluid, no contractions/cramping, no vaginal discharge. Fetal movement: normal.    Menstrual History:  OB History          1    Para        Term                AB        Living             SAB        IAB        Ectopic        Multiple        Live Births                    Patient's last menstrual period was 2024 (exact date).       Objective:      /76   Wt 95.1 kg (209 lb 12.3 oz)   LMP 2024 (Exact Date)     Vitals  BP: 114/76  Weight: 95.1 kg (209 lb 12.3 oz)  Prenatal  Fundal Height (cm): 31 cm  Fetal Heart Rate: 144  Movement: Present  Fundal height size equal to dates  8.25 kg (18 lb 3 oz)           Prenatal Labs:  Lab Results   Component Value Date    GROUPTRH A POS 2024    HGB 12.4 2024    HCT 37.6 2024     2024    RUBELLAIMMUN Reactive 2024    HEPBSAG Non-reactive 2024    [id number] Non-reactive 2024    LABCHLA Not Detected 2024    LABNGO Not Detected 2024    LABURIN ESCHERICHIA COLI  >100,000 cfu/ml   (A) 2024    OBGLUCOSESCR 119 2024       Assessment:      Pregnancy 31w0d      Plan:     Final EDC: 2024 by 12wk ultrasound  Vaccines: declined flu, discussed Tdap & RSV  BMI: 33, recommended 11-20# gain  Childhood asthma, has inhaler, rarely using  Back Surg: needs anesthesia consults  Genetic: declined  Nullip: aspirin   UTI 1st Tri: Treated.   Pap 2024 NILM  Peds: [facility]  Breastfeeding, ordered pump  Contraception: Declined  STPH classes info given          1. Encounter for supervision of normal pregnancy in second trimester, unspecified   -     POCT Urinalysis(Instrument)  -     CBC Auto Differential; Future; Expected date: 2024         Follow up in 2 weeks.       I reviewed today  her blood pressure, weight gain, urine results and  fetal heart rate.  I have reviewed the previous labs and ultrasound with the patient.   I have answered questions to her satisfaction and total of 20 minutes spend today

## 2024-12-05 NOTE — TELEPHONE ENCOUNTER
Returned pt call , told pt her partner can get Tdap with us at ext visit. Pt understood and had no further questions.

## 2024-12-05 NOTE — TELEPHONE ENCOUNTER
----- Message from Maye sent at 12/5/2024 12:32 PM CST -----  Regarding: TDAP shot for significant other  Patient wants to know if her significant other can get the TDAP shot here at our office. If so, then does he have to be scheduled through Epic? Please follow up with patient. Thanks!

## 2024-12-05 NOTE — Clinical Note
Please make all her OB FU visits until delivery. She will need a visit every two weeks until 36 weeks. Then a visit weekly until 40 weeks. She will need a growth US at 36 weeks.   Any day or time

## 2024-12-12 ENCOUNTER — PATIENT MESSAGE (OUTPATIENT)
Dept: OTHER | Facility: OTHER | Age: 19
End: 2024-12-12
Payer: MEDICAID

## 2024-12-18 ENCOUNTER — ROUTINE PRENATAL (OUTPATIENT)
Dept: OBSTETRICS AND GYNECOLOGY | Facility: CLINIC | Age: 19
End: 2024-12-18
Payer: MEDICAID

## 2024-12-18 VITALS — DIASTOLIC BLOOD PRESSURE: 62 MMHG | WEIGHT: 204.13 LBS | SYSTOLIC BLOOD PRESSURE: 124 MMHG

## 2024-12-18 DIAGNOSIS — Z3A.32 32 WEEKS GESTATION OF PREGNANCY: Primary | ICD-10-CM

## 2024-12-18 LAB
BILIRUBIN, UA POC OHS: NEGATIVE
BLOOD, UA POC OHS: ABNORMAL
CLARITY, UA POC OHS: CLEAR
COLOR, UA POC OHS: YELLOW
GLUCOSE, UA POC OHS: NEGATIVE
KETONES, UA POC OHS: NEGATIVE
LEUKOCYTES, UA POC OHS: ABNORMAL
NITRITE, UA POC OHS: NEGATIVE
PH, UA POC OHS: 5.5
PROTEIN, UA POC OHS: 30
SPECIFIC GRAVITY, UA POC OHS: >=1.03
UROBILINOGEN, UA POC OHS: 0.2

## 2024-12-18 PROCEDURE — 90661 CCIIV3 VAC ABX FR 0.5 ML IM: CPT | Mod: PBBFAC,PN

## 2024-12-18 PROCEDURE — 99999 PR PBB SHADOW E&M-EST. PATIENT-LVL II: CPT | Mod: PBBFAC,,, | Performed by: OBSTETRICS & GYNECOLOGY

## 2024-12-18 PROCEDURE — 99999PBSHW PR PBB SHADOW TECHNICAL ONLY FILED TO HB: Mod: PBBFAC,,,

## 2024-12-18 PROCEDURE — 81003 URINALYSIS AUTO W/O SCOPE: CPT | Mod: PBBFAC,PN | Performed by: OBSTETRICS & GYNECOLOGY

## 2024-12-18 PROCEDURE — 99212 OFFICE O/P EST SF 10 MIN: CPT | Mod: PBBFAC,PN | Performed by: OBSTETRICS & GYNECOLOGY

## 2024-12-18 PROCEDURE — 90471 IMMUNIZATION ADMIN: CPT | Mod: PBBFAC,PN

## 2024-12-18 PROCEDURE — 90715 TDAP VACCINE 7 YRS/> IM: CPT | Mod: PBBFAC,PN

## 2024-12-18 PROCEDURE — 99999PBSHW POCT URINALYSIS(INSTRUMENT): Mod: PBBFAC,,,

## 2024-12-18 PROCEDURE — 90472 IMMUNIZATION ADMIN EACH ADD: CPT | Mod: PBBFAC,PN

## 2024-12-18 RX ADMIN — INFLUENZA A VIRUS A/GEORGIA/12/2022 CVR-167 (H1N1) ANTIGEN (MDCK CELL DERIVED, PROPIOLACTONE INACTIVATED), INFLUENZA A VIRUS A/SYDNEY/1304/2022 (H3N2) ANTIGEN (MDCK CELL DERIVED, PROPIOLACTONE INACTIVATED), INFLUENZA B VIRUS B/SINGAPORE/WUH4618/2021 ANTIGEN (MDCK CELL DERIVED, PROPIOLACTONE INACTIVATED) 0.5 ML: 15; 15; 15 INJECTION, SUSPENSION INTRAMUSCULAR at 03:12

## 2024-12-18 RX ADMIN — TETANUS TOXOID, REDUCED DIPHTHERIA TOXOID AND ACELLULAR PERTUSSIS VACCINE, ADSORBED 0.5 ML: 5; 2.5; 8; 8; 2.5 SUSPENSION INTRAMUSCULAR at 03:12

## 2024-12-18 NOTE — PROGRESS NOTES
Subjective:      Chela Gold is a 19 y.o. female being seen today for her obstetrical visit. She is at 32w6d gestation. Patient reports no complaints.     OB ROS: no vaginal bleeding, no leakage of fluid, no contractions/cramping, no vaginal discharge. Fetal movement: normal.    Menstrual History:  OB History          1    Para        Term                AB        Living             SAB        IAB        Ectopic        Multiple        Live Births                    Patient's last menstrual period was 2024 (exact date).       Objective:      /62   Wt 92.6 kg (204 lb 2.3 oz)   LMP 2024 (Exact Date)     Vitals  BP: 124/62  Weight: 92.6 kg (204 lb 2.3 oz)  Prenatal  Fundal Height (cm): 32 cm  Fetal Heart Rate: 141  Movement: Present  Fundal height size equal to dates  5.7 kg (12 lb 9.1 oz)           Prenatal Labs:  Lab Results   Component Value Date    GROUPTRH A POS 2024    HGB 12.4 2024    HCT 37.6 2024     2024    RUBELLAIMMUN Reactive 2024    HEPBSAG Non-reactive 2024    [id number] Non-reactive 2024    LABCHLA Not Detected 2024    LABNGO Not Detected 2024    LABURIN ESCHERICHIA COLI  >100,000 cfu/ml   (A) 2024    OBGLUCOSESCR 119 2024       Assessment:      Pregnancy 32w6d      Plan:     Final EDC: 2024 by 12wk ultrasound  Vaccines: flu & Tdap 2024 & considering RSV  BMI: 33, recommended 11-20# gain  Childhood asthma, has inhaler, rarely using  Back Surg: needs anesthesia consults  Genetic: declined  Nullip: aspirin   UTI 1st Tri: Treated.   Pap 2024 NILM  Peds: [facility]  Breastfeeding, ordered pump  Contraception: Declined  STPH classes info given          1. 32 weeks gestation of pregnancy  -     POCT Urinalysis(Instrument)         Follow up in 2 weeks.       I reviewed today her blood pressure, weight gain, urine results and  fetal heart rate.  I have reviewed the previous labs and  ultrasound with the patient.   I have answered questions to her satisfaction and total of 20 minutes spend today

## 2025-01-02 ENCOUNTER — ROUTINE PRENATAL (OUTPATIENT)
Dept: OBSTETRICS AND GYNECOLOGY | Facility: CLINIC | Age: 20
End: 2025-01-02
Payer: MEDICAID

## 2025-01-02 ENCOUNTER — PATIENT MESSAGE (OUTPATIENT)
Dept: OTHER | Facility: OTHER | Age: 20
End: 2025-01-02
Payer: MEDICAID

## 2025-01-02 VITALS — SYSTOLIC BLOOD PRESSURE: 118 MMHG | WEIGHT: 218.94 LBS | DIASTOLIC BLOOD PRESSURE: 72 MMHG

## 2025-01-02 DIAGNOSIS — Z34.92 ENCOUNTER FOR SUPERVISION OF NORMAL PREGNANCY IN SECOND TRIMESTER, UNSPECIFIED GRAVIDITY: Primary | ICD-10-CM

## 2025-01-02 DIAGNOSIS — M43.10 SPONDYLOLISTHESIS, UNSPECIFIED SPINAL REGION: ICD-10-CM

## 2025-01-02 PROCEDURE — 81003 URINALYSIS AUTO W/O SCOPE: CPT | Mod: PBBFAC,PN | Performed by: OBSTETRICS & GYNECOLOGY

## 2025-01-02 PROCEDURE — 99213 OFFICE O/P EST LOW 20 MIN: CPT | Mod: PBBFAC,TH,PN | Performed by: OBSTETRICS & GYNECOLOGY

## 2025-01-02 PROCEDURE — 99999PBSHW POCT URINALYSIS(INSTRUMENT): Mod: PBBFAC,,,

## 2025-01-02 PROCEDURE — 99999 PR PBB SHADOW E&M-EST. PATIENT-LVL III: CPT | Mod: PBBFAC,,, | Performed by: OBSTETRICS & GYNECOLOGY

## 2025-01-02 PROCEDURE — 99213 OFFICE O/P EST LOW 20 MIN: CPT | Mod: TH,S$PBB,, | Performed by: OBSTETRICS & GYNECOLOGY

## 2025-01-02 NOTE — PROGRESS NOTES
Subjective:      Chela Gold is a 19 y.o. female being seen today for her obstetrical visit. She is at 35w0d gestation. Patient reports no complaints.     OB ROS: no vaginal bleeding, no leakage of fluid, no contractions/cramping, no vaginal discharge. Fetal movement: normal.    Menstrual History:  OB History          1    Para        Term                AB        Living             SAB        IAB        Ectopic        Multiple        Live Births                    Patient's last menstrual period was 2024 (exact date).       Objective:      /72   Wt 99.3 kg (218 lb 14.7 oz)   LMP 2024 (Exact Date)     Vitals  BP: 118/72  Weight: 99.3 kg (218 lb 14.7 oz)  Prenatal  Fundal Height (cm): 35 cm  Fetal Heart Rate: 134  Movement: Present  Fundal height size equal to dates  12.4 kg (27 lb 5.4 oz)           Prenatal Labs:  Lab Results   Component Value Date    GROUPTRH A POS 2024    HGB 12.4 2024    HCT 37.6 2024     2024    RUBELLAIMMUN Reactive 2024    HEPBSAG Non-reactive 2024    [id number] Non-reactive 2024    LABCHLA Not Detected 2024    LABNGO Not Detected 2024    LABURIN ESCHERICHIA COLI  >100,000 cfu/ml   (A) 2024    OBGLUCOSESCR 119 2024       Assessment:      Pregnancy 35w0d      Plan:     Final EDC: 2024 by 12wk ultrasound  Vaccines: flu & Tdap 2024 & at next visit RSV  BMI: 33, recommended 11-20# gain  Childhood asthma, has inhaler, rarely using  Back Surg: needs anesthesia consults  Genetic: declined  Nullip: aspirin   UTI 1st Tri: Treated.   Pap 2024 NILM  Peds: [facility]  Breastfeeding, ordered pump  Contraception: Declined  STPH classes info given          1. Encounter for supervision of normal pregnancy in second trimester, unspecified   -     POCT Urinalysis(Instrument)  -     CBC Auto Differential; Future; Expected date: 2025  -     US OB Limited 1 Or More Gestations;  Future; Expected date: 02/02/2025    2. Spondylolisthesis, unspecified spinal region  Overview:  Anterior and posterior spinal fusion per Dr Collins, May 2015    Orders:  -     Ambulatory referral/consult to OB Anesthesiology; Future; Expected date: 01/09/2025         Follow up in 1 weeks.       I reviewed today her blood pressure, weight gain, urine results and  fetal heart rate.  I have reviewed the previous labs and ultrasound with the patient.   I have answered questions to her satisfaction and total of 20 minutes spend today

## 2025-01-08 ENCOUNTER — HOSPITAL ENCOUNTER (OUTPATIENT)
Dept: RADIOLOGY | Facility: HOSPITAL | Age: 20
Discharge: HOME | End: 2025-01-08
Attending: OBSTETRICS & GYNECOLOGY
Payer: MEDICAID

## 2025-01-08 ENCOUNTER — ROUTINE PRENATAL (OUTPATIENT)
Dept: OBSTETRICS AND GYNECOLOGY | Facility: CLINIC | Age: 20
End: 2025-01-08
Payer: MEDICAID

## 2025-01-08 VITALS — SYSTOLIC BLOOD PRESSURE: 116 MMHG | DIASTOLIC BLOOD PRESSURE: 68 MMHG | WEIGHT: 211 LBS

## 2025-01-08 DIAGNOSIS — M43.10 SPONDYLOLISTHESIS, UNSPECIFIED SPINAL REGION: ICD-10-CM

## 2025-01-08 DIAGNOSIS — Z34.92 ENCOUNTER FOR SUPERVISION OF NORMAL PREGNANCY IN SECOND TRIMESTER, UNSPECIFIED GRAVIDITY: ICD-10-CM

## 2025-01-08 DIAGNOSIS — Z34.92 ENCOUNTER FOR SUPERVISION OF NORMAL PREGNANCY IN SECOND TRIMESTER, UNSPECIFIED GRAVIDITY: Primary | ICD-10-CM

## 2025-01-08 LAB
BILIRUBIN, UA POC OHS: ABNORMAL
BLOOD, UA POC OHS: ABNORMAL
CLARITY, UA POC OHS: CLEAR
COLOR, UA POC OHS: YELLOW
GLUCOSE, UA POC OHS: NEGATIVE
KETONES, UA POC OHS: 15
LEUKOCYTES, UA POC OHS: ABNORMAL
NITRITE, UA POC OHS: NEGATIVE
PH, UA POC OHS: 6
PROTEIN, UA POC OHS: 30
SPECIFIC GRAVITY, UA POC OHS: >=1.03
UROBILINOGEN, UA POC OHS: 0.2

## 2025-01-08 PROCEDURE — 90678 RSV VACC PREF BIVALENT IM: CPT | Mod: PBBFAC,PN

## 2025-01-08 PROCEDURE — 87653 STREP B DNA AMP PROBE: CPT | Performed by: OBSTETRICS & GYNECOLOGY

## 2025-01-08 PROCEDURE — 99213 OFFICE O/P EST LOW 20 MIN: CPT | Mod: TH,S$PBB,, | Performed by: OBSTETRICS & GYNECOLOGY

## 2025-01-08 PROCEDURE — 99999 PR PBB SHADOW E&M-EST. PATIENT-LVL III: CPT | Mod: PBBFAC,,, | Performed by: OBSTETRICS & GYNECOLOGY

## 2025-01-08 PROCEDURE — 99213 OFFICE O/P EST LOW 20 MIN: CPT | Mod: PBBFAC,25,TH,PN | Performed by: OBSTETRICS & GYNECOLOGY

## 2025-01-08 PROCEDURE — 76816 OB US FOLLOW-UP PER FETUS: CPT | Mod: 26,,, | Performed by: RADIOLOGY

## 2025-01-08 PROCEDURE — 87088 URINE BACTERIA CULTURE: CPT | Performed by: OBSTETRICS & GYNECOLOGY

## 2025-01-08 PROCEDURE — 81003 URINALYSIS AUTO W/O SCOPE: CPT | Mod: PBBFAC,PN | Performed by: OBSTETRICS & GYNECOLOGY

## 2025-01-08 PROCEDURE — 99999PBSHW PR PBB SHADOW TECHNICAL ONLY FILED TO HB: Mod: PBBFAC,,,

## 2025-01-08 PROCEDURE — 99999PBSHW POCT URINALYSIS(INSTRUMENT): Mod: PBBFAC,,,

## 2025-01-08 PROCEDURE — 76816 OB US FOLLOW-UP PER FETUS: CPT | Mod: TC,PN

## 2025-01-08 PROCEDURE — 87147 CULTURE TYPE IMMUNOLOGIC: CPT | Performed by: OBSTETRICS & GYNECOLOGY

## 2025-01-08 PROCEDURE — 90471 IMMUNIZATION ADMIN: CPT | Mod: PBBFAC,PN

## 2025-01-08 PROCEDURE — 87086 URINE CULTURE/COLONY COUNT: CPT | Performed by: OBSTETRICS & GYNECOLOGY

## 2025-01-08 RX ADMIN — Medication 120 MCG: at 09:01

## 2025-01-09 LAB
BACTERIA UR CULT: ABNORMAL

## 2025-01-10 LAB — GROUP B STREPTOCOCCUS, PCR: POSITIVE

## 2025-01-15 ENCOUNTER — ROUTINE PRENATAL (OUTPATIENT)
Dept: OBSTETRICS AND GYNECOLOGY | Facility: CLINIC | Age: 20
End: 2025-01-15
Payer: MEDICAID

## 2025-01-15 VITALS — SYSTOLIC BLOOD PRESSURE: 122 MMHG | DIASTOLIC BLOOD PRESSURE: 78 MMHG

## 2025-01-15 DIAGNOSIS — Z34.92 ENCOUNTER FOR SUPERVISION OF NORMAL PREGNANCY IN SECOND TRIMESTER, UNSPECIFIED GRAVIDITY: Primary | ICD-10-CM

## 2025-01-15 LAB
BILIRUBIN, UA POC OHS: NEGATIVE
BLOOD, UA POC OHS: NEGATIVE
CLARITY, UA POC OHS: CLEAR
COLOR, UA POC OHS: ABNORMAL
GLUCOSE, UA POC OHS: NEGATIVE
KETONES, UA POC OHS: NEGATIVE
LEUKOCYTES, UA POC OHS: ABNORMAL
NITRITE, UA POC OHS: NEGATIVE
PH, UA POC OHS: 6.5
PROTEIN, UA POC OHS: 30
SPECIFIC GRAVITY, UA POC OHS: >=1.03
UROBILINOGEN, UA POC OHS: 0.2

## 2025-01-15 PROCEDURE — 99999 PR PBB SHADOW E&M-EST. PATIENT-LVL II: CPT | Mod: PBBFAC,,, | Performed by: OBSTETRICS & GYNECOLOGY

## 2025-01-15 PROCEDURE — 81003 URINALYSIS AUTO W/O SCOPE: CPT | Mod: PBBFAC,PN | Performed by: OBSTETRICS & GYNECOLOGY

## 2025-01-15 PROCEDURE — 99213 OFFICE O/P EST LOW 20 MIN: CPT | Mod: TH,S$PBB,, | Performed by: OBSTETRICS & GYNECOLOGY

## 2025-01-15 PROCEDURE — 99212 OFFICE O/P EST SF 10 MIN: CPT | Mod: PBBFAC,TH,PN | Performed by: OBSTETRICS & GYNECOLOGY

## 2025-01-15 PROCEDURE — 99999PBSHW POCT URINALYSIS(INSTRUMENT): Mod: PBBFAC,,,

## 2025-01-17 NOTE — PROGRESS NOTES
Subjective:      Chela Gold is a 19 y.o. female being seen today for her obstetrical visit. She is at 37w1d gestation. Patient reports no complaints.     OB ROS: no vaginal bleeding, no leakage of fluid, no contractions/cramping, no vaginal discharge. Fetal movement: normal.    Menstrual History:  OB History          1    Para        Term                AB        Living             SAB        IAB        Ectopic        Multiple        Live Births                    Patient's last menstrual period was 2024 (exact date).       Objective:      /78   LMP 2024 (Exact Date)     Vitals  BP: 122/78  Prenatal  Fetal Heart Rate: 132  Movement: Present  Fundal height size equal to dates  8.8 kg (19 lb 6.4 oz)           Prenatal Labs:  Lab Results   Component Value Date    GROUPTRH A POS 2024    HGB 11.4 (L) 2025    HCT 35.3 (L) 2025     2025    RUBELLAIMMUN Reactive 2024    HEPBSAG Non-reactive 2024    [id number] Non-reactive 2024    LABCHLA Not Detected 2024    LABNGO Not Detected 2024    LABURIN  2025     Multiple organisms isolated. None in predominance.  Repeat if    LABURIN clinically necessary. 2025    LABURIN (A) 2025     STREPTOCOCCUS AGALACTIAE (GROUP B)  < 10,000 cfu/ml  In case of Penicillin allergy, call lab for further testing.  Beta-hemolytic streptococci are routinely susceptible to   penicillins,cephalosporins and carbapenems.  Susceptibility testing not routinely performed      OBGLUCOSESCR 119 2024       Assessment:      Pregnancy 37w1d      Plan:     Final EDC: 2024 by 12wk ultrasound  GBS bacturia & positive  Vaccines: flu & Tdap 2024 & RSV 2025  BMI: 33, recommended 11-20# gain  Childhood asthma, has inhaler, rarely using  Back Surg: Per anesthesia her surgery/fusion was L5-S1, should be able to placed epidural above that location.   Genetic: declined  Nullip: aspirin    UTI 1st Tri: Treated.   Pap 2024 NILM  Peds: [facility]  Breastfeeding, ordered pump  Contraception: Declined  STPH classes info given  Growth: 35.6wk, EFW 6#, 47%          1. Encounter for supervision of normal pregnancy in second trimester, unspecified   -     POCT Urinalysis(Instrument)         Follow up in 1 weeks.       I reviewed today her blood pressure, weight gain, urine results and  fetal heart rate.  I have reviewed the previous labs and ultrasound with the patient.   I have answered questions to her satisfaction and total of 20 minutes spend today

## 2025-01-24 ENCOUNTER — LAB VISIT (OUTPATIENT)
Dept: LAB | Facility: HOSPITAL | Age: 20
End: 2025-01-24
Payer: MEDICAID

## 2025-01-24 ENCOUNTER — ROUTINE PRENATAL (OUTPATIENT)
Dept: OBSTETRICS AND GYNECOLOGY | Facility: CLINIC | Age: 20
End: 2025-01-24
Payer: MEDICAID

## 2025-01-24 VITALS — WEIGHT: 218.06 LBS | DIASTOLIC BLOOD PRESSURE: 80 MMHG | SYSTOLIC BLOOD PRESSURE: 132 MMHG

## 2025-01-24 DIAGNOSIS — R03.0 ELEVATED BLOOD PRESSURE READING: ICD-10-CM

## 2025-01-24 DIAGNOSIS — Z3A.38 38 WEEKS GESTATION OF PREGNANCY: Primary | ICD-10-CM

## 2025-01-24 LAB
ALBUMIN SERPL BCP-MCNC: 2.8 G/DL (ref 3.5–5.2)
ALP SERPL-CCNC: 169 U/L (ref 40–150)
ALT SERPL W/O P-5'-P-CCNC: 16 U/L (ref 10–44)
ANION GAP SERPL CALC-SCNC: 9 MMOL/L (ref 8–16)
AST SERPL-CCNC: 13 U/L (ref 10–40)
BASOPHILS # BLD AUTO: 0.01 K/UL (ref 0–0.2)
BASOPHILS NFR BLD: 0.1 % (ref 0–1.9)
BILIRUB SERPL-MCNC: 0.3 MG/DL (ref 0.1–1)
BILIRUBIN, UA POC OHS: NEGATIVE
BLOOD, UA POC OHS: NEGATIVE
BUN SERPL-MCNC: 10 MG/DL (ref 6–20)
CALCIUM SERPL-MCNC: 9.3 MG/DL (ref 8.7–10.5)
CHLORIDE SERPL-SCNC: 106 MMOL/L (ref 95–110)
CLARITY, UA POC OHS: ABNORMAL
CO2 SERPL-SCNC: 23 MMOL/L (ref 23–29)
COLOR, UA POC OHS: ABNORMAL
CREAT SERPL-MCNC: 0.6 MG/DL (ref 0.5–1.4)
DIFFERENTIAL METHOD BLD: ABNORMAL
EOSINOPHIL # BLD AUTO: 0.1 K/UL (ref 0–0.5)
EOSINOPHIL NFR BLD: 0.6 % (ref 0–8)
ERYTHROCYTE [DISTWIDTH] IN BLOOD BY AUTOMATED COUNT: 12.9 % (ref 11.5–14.5)
EST. GFR  (NO RACE VARIABLE): >60 ML/MIN/1.73 M^2
GLUCOSE SERPL-MCNC: 92 MG/DL (ref 70–110)
GLUCOSE, UA POC OHS: NEGATIVE
HCT VFR BLD AUTO: 36.4 % (ref 37–48.5)
HGB BLD-MCNC: 11.9 G/DL (ref 12–16)
IMM GRANULOCYTES # BLD AUTO: 0.03 K/UL (ref 0–0.04)
IMM GRANULOCYTES NFR BLD AUTO: 0.3 % (ref 0–0.5)
KETONES, UA POC OHS: NEGATIVE
LEUKOCYTES, UA POC OHS: ABNORMAL
LYMPHOCYTES # BLD AUTO: 1.9 K/UL (ref 1–4.8)
LYMPHOCYTES NFR BLD: 21 % (ref 18–48)
MCH RBC QN AUTO: 29.6 PG (ref 27–31)
MCHC RBC AUTO-ENTMCNC: 32.7 G/DL (ref 32–36)
MCV RBC AUTO: 91 FL (ref 82–98)
MONOCYTES # BLD AUTO: 0.7 K/UL (ref 0.3–1)
MONOCYTES NFR BLD: 7.5 % (ref 4–15)
NEUTROPHILS # BLD AUTO: 6.3 K/UL (ref 1.8–7.7)
NEUTROPHILS NFR BLD: 70.5 % (ref 38–73)
NITRITE, UA POC OHS: NEGATIVE
NRBC BLD-RTO: 0 /100 WBC
PH, UA POC OHS: 6
PLATELET # BLD AUTO: 234 K/UL (ref 150–450)
PMV BLD AUTO: 10.2 FL (ref 9.2–12.9)
POTASSIUM SERPL-SCNC: 3.8 MMOL/L (ref 3.5–5.1)
PROT SERPL-MCNC: 7.2 G/DL (ref 6–8.4)
PROTEIN, UA POC OHS: 100
RBC # BLD AUTO: 4.02 M/UL (ref 4–5.4)
SODIUM SERPL-SCNC: 138 MMOL/L (ref 136–145)
SPECIFIC GRAVITY, UA POC OHS: >=1.03
UROBILINOGEN, UA POC OHS: 0.2
WBC # BLD AUTO: 8.91 K/UL (ref 3.9–12.7)

## 2025-01-24 PROCEDURE — 80053 COMPREHEN METABOLIC PANEL: CPT

## 2025-01-24 PROCEDURE — 99213 OFFICE O/P EST LOW 20 MIN: CPT | Mod: TH,S$PBB,,

## 2025-01-24 PROCEDURE — 81003 URINALYSIS AUTO W/O SCOPE: CPT | Mod: PBBFAC,PN

## 2025-01-24 PROCEDURE — 82570 ASSAY OF URINE CREATININE: CPT

## 2025-01-24 PROCEDURE — 36415 COLL VENOUS BLD VENIPUNCTURE: CPT | Mod: PO

## 2025-01-24 PROCEDURE — 85025 COMPLETE CBC W/AUTO DIFF WBC: CPT

## 2025-01-24 PROCEDURE — 99212 OFFICE O/P EST SF 10 MIN: CPT | Mod: PBBFAC,PN

## 2025-01-24 PROCEDURE — 99999 PR PBB SHADOW E&M-EST. PATIENT-LVL II: CPT | Mod: PBBFAC,,,

## 2025-01-24 PROCEDURE — 99999PBSHW POCT URINALYSIS(INSTRUMENT): Mod: PBBFAC,,,

## 2025-01-24 NOTE — PROGRESS NOTES
The patient presents with No complaints.   Reports: Good fetal movements reported. No Bleeding or pains  No CP, SOB, HA, vision changes or epigastric pain.     2025  19 y.o. 38w1d Estimated Date of Delivery: 25, dating reviewed.   OB History    Para Term  AB Living   1             SAB IAB Ectopic Multiple Live Births                  # Outcome Date GA Lbr Richard/2nd Weight Sex Type Anes PTL Lv   1 Current                Prenatal labs reviewed and updated today    Review of Systems:  General ROS: negative for headache or visual changes  Breast ROS: negative for breast lumps  Gastrointestinal ROS: negative for constipation, diarrhea or nausea/vomiting  Musculoskeletal ROS: negative for pain in joints or swelling in face or hands.   Neurological ROS: negative for - headaches, numbness/tingling or visual changes      Physical Exam:  BP (!) 142/72   Wt 98.9 kg (218 lb 0.6 oz)   LMP 2024 (Exact Date)   Urine Dip: Pending  Fundal Height:deferred   Fetal Heart Tones: 146 bpm  Constitutional: She is oriented to person, place, and time. She appears well-developed and well-nourished. No distress. Overweight   Cardiovascular: Normal rate.    Pulmonary/Chest: Effort normal. No respiratory distress  Abdominal: Soft, gravid, nontender. No rebound and no guarding.     Genitourinary: 1/thick/high    Musculoskeletal: Normal range of motion, Minimal peripheral edema.   Neurological: She is alert and oriented to person, place, and time. Coordination normal.   Skin: Skin is warm and dry. She is not diaphoretic.  Psychiatric: She has a normal mood and affect.        Assessment:  19 y.o., at 38w1d Gestation   Patient Active Problem List   Diagnosis    Asthma, currently active    Vocal cord dysfunction    Spondylolisthesis     Current Outpatient Medications on File Prior to Visit   Medication Sig Dispense Refill    aspirin (ECOTRIN) 81 MG EC tablet Take 1 tablet (81 mg total) by mouth once daily. At 12 weeks  start taking aspirin daily to prevent preeclampsia. 150 tablet 2    doxylamine succinate (UNISOM, DOXYLAMINE,) 25 mg tablet Take 1/2 tablet three times a day to prevent nausea/vomitting in pregnancy (Patient not taking: Reported on 1/24/2025)      promethazine (PHENERGAN) 25 MG tablet Take 1 tablet (25 mg total) by mouth every 6 (six) hours as needed for Nausea. (Patient not taking: Reported on 1/24/2025) 30 tablet 1     No current facility-administered medications on file prior to visit.       Plan:   PreE precautions given. Will keep home log as well  Labs today:preE labs today   Orders today: none  MEds today: none  Procedures Today: none  Follow up 1 Weeks, bleeding/pain precautions, kick counts, labor precautions      I spent a total of 20 minutes on the day of the visit. This includes face to face time and non-face to face time preparing to see the patient (eg, review of tests), obtaining and/or reviewing separately obtained history, documenting clinical information in the electronic or other health record, independently interpreting results and communicating results to the patient/family/caregiver, or care coordinator.

## 2025-01-25 LAB
CREAT UR-MCNC: 270 MG/DL (ref 15–325)
PROT UR-MCNC: 39 MG/DL (ref 0–15)
PROT/CREAT UR: 0.14 MG/G{CREAT} (ref 0–0.2)

## 2025-01-29 ENCOUNTER — NURSE TRIAGE (OUTPATIENT)
Dept: ADMINISTRATIVE | Facility: CLINIC | Age: 20
End: 2025-01-29
Payer: MEDICAID

## 2025-01-29 NOTE — TELEPHONE ENCOUNTER
Caller states she is having severe pelvic and abd pain. Caller states that she loss her mucus plug x 2 days ago. Caller states that she has been having a discharge since that is sometimes pink or yellow.  Pt advised L & D per protocol and verbalized understanding.   Reason for Disposition   [1] Constant abdominal pain AND [2] present > 2 hours    Additional Information   Negative: [1] Pregnant 23 or more weeks AND [2] baby is moving less today (e.g., kick count < 5 in 1 hour or < 10 in 2 hours)   Negative: Patient sounds very sick or weak to the triager    Protocols used: Pregnancy - Vaginal [id number]

## 2025-01-30 ENCOUNTER — PATIENT MESSAGE (OUTPATIENT)
Dept: OBSTETRICS AND GYNECOLOGY | Facility: CLINIC | Age: 20
End: 2025-01-30
Payer: MEDICAID